# Patient Record
Sex: FEMALE | URBAN - METROPOLITAN AREA
[De-identification: names, ages, dates, MRNs, and addresses within clinical notes are randomized per-mention and may not be internally consistent; named-entity substitution may affect disease eponyms.]

---

## 2024-07-17 ENCOUNTER — APPOINTMENT (RX ONLY)
Dept: URBAN - METROPOLITAN AREA CLINIC 111 | Facility: CLINIC | Age: 29
Setting detail: DERMATOLOGY
End: 2024-07-17

## 2024-07-17 DIAGNOSIS — L81.1 CHLOASMA: ICD-10-CM

## 2024-07-17 PROCEDURE — ? PRESCRIPTION MEDICATION MANAGEMENT

## 2024-07-17 PROCEDURE — ? COUNSELING

## 2024-07-17 PROCEDURE — 99203 OFFICE O/P NEW LOW 30 MIN: CPT

## 2024-07-17 PROCEDURE — ? PRESCRIPTION

## 2024-07-17 RX ORDER — AZELAIC ACID 0.15 G/G
GEL TOPICAL
Qty: 50 | Refills: 3 | Status: ERX | COMMUNITY
Start: 2024-07-17

## 2024-07-17 RX ADMIN — AZELAIC ACID: 0.15 GEL TOPICAL at 00:00

## 2024-07-17 ASSESSMENT — LOCATION DETAILED DESCRIPTION DERM: LOCATION DETAILED: INFERIOR MID FOREHEAD

## 2024-07-17 ASSESSMENT — LOCATION ZONE DERM: LOCATION ZONE: FACE

## 2024-07-17 ASSESSMENT — LOCATION SIMPLE DESCRIPTION DERM: LOCATION SIMPLE: INFERIOR FOREHEAD

## 2024-07-17 NOTE — HPI: DISCOLORATION
How Severe Is Your Skin Discoloration?: moderate
Additional History: Pt reports dark spot on forehead and some around the mouth. States a dermatologist in [place] prescribed her with oral tablet “tranexamic acid”  which helped to lighten it but not completely. She would like to discuss treatment options.

## 2024-07-17 NOTE — PROCEDURE: PRESCRIPTION MEDICATION MANAGEMENT
Detail Level: Zone
Plan: -\\n- Discussed various treatments including topicals, chemical peels, and micro needling.
Initiate Treatment: -\\n- Diligent sun protection, 30 spf or higher \\n- Vitamin C serum ([place] or revisions)\\n- Azelaic acid: apply once daily in the morning along with la Roche Posay serum\\n- HQ 6%/KA 3%/ TA 3%: apply to only dark areas of face nightly for 6 weeks on then 1 week off. Repeat as needed until resolved but d/c after 4 months of use. (Called into symphony). Can use with la Roche Posay serum as well\\n- Heliocare supplement: take prior to being in the sun
Render In Strict Bullet Format?: No

## 2024-09-17 ENCOUNTER — APPOINTMENT (RX ONLY)
Dept: URBAN - METROPOLITAN AREA CLINIC 111 | Facility: CLINIC | Age: 29
Setting detail: DERMATOLOGY
End: 2024-09-17

## 2024-09-17 DIAGNOSIS — L81.1 CHLOASMA: ICD-10-CM | Status: IMPROVED

## 2024-09-17 PROCEDURE — ? COUNSELING

## 2024-09-17 PROCEDURE — 99213 OFFICE O/P EST LOW 20 MIN: CPT

## 2024-09-17 PROCEDURE — ? PRESCRIPTION MEDICATION MANAGEMENT

## 2024-09-17 ASSESSMENT — LOCATION DETAILED DESCRIPTION DERM: LOCATION DETAILED: INFERIOR MID FOREHEAD

## 2024-09-17 ASSESSMENT — LOCATION SIMPLE DESCRIPTION DERM: LOCATION SIMPLE: INFERIOR FOREHEAD

## 2024-09-17 ASSESSMENT — LOCATION ZONE DERM: LOCATION ZONE: FACE

## 2024-09-17 NOTE — PROCEDURE: PRESCRIPTION MEDICATION MANAGEMENT
Detail Level: Zone
Plan: -\\n- Discussed various treatments including topicals, chemical peels, and micro needling.
Continue Regimen: -\\n- Diligent sun protection, 30 spf or higher \\n- Vitamin C serum ([place] or revisions)\\n- Azelaic acid: apply once daily in the morning along with la Roche Posay serum\\n- HQ 6%/KA 3%/ TA 3%: apply to only dark areas of face nightly for 6 weeks on then 1 week off. Repeat as needed until resolved but d/c after 4 months of use. Can use with la Roche Posay serum as well\\n- Heliocare supplement: take prior to being in the sun.
Render In Strict Bullet Format?: No